# Patient Record
Sex: MALE | Race: BLACK OR AFRICAN AMERICAN | NOT HISPANIC OR LATINO | Employment: OTHER | URBAN - METROPOLITAN AREA
[De-identification: names, ages, dates, MRNs, and addresses within clinical notes are randomized per-mention and may not be internally consistent; named-entity substitution may affect disease eponyms.]

---

## 2018-08-03 ENCOUNTER — OFFICE VISIT (OUTPATIENT)
Dept: FAMILY MEDICINE | Facility: CLINIC | Age: 43
End: 2018-08-03
Attending: FAMILY MEDICINE
Payer: COMMERCIAL

## 2018-08-03 VITALS
OXYGEN SATURATION: 95 % | BODY MASS INDEX: 37.01 KG/M2 | DIASTOLIC BLOOD PRESSURE: 72 MMHG | HEIGHT: 67 IN | WEIGHT: 235.81 LBS | HEART RATE: 81 BPM | SYSTOLIC BLOOD PRESSURE: 110 MMHG

## 2018-08-03 DIAGNOSIS — K21.9 GASTROESOPHAGEAL REFLUX DISEASE, ESOPHAGITIS PRESENCE NOT SPECIFIED: ICD-10-CM

## 2018-08-03 DIAGNOSIS — R42 DIZZINESS: Primary | ICD-10-CM

## 2018-08-03 DIAGNOSIS — M54.32 LEFT SIDED SCIATICA: ICD-10-CM

## 2018-08-03 DIAGNOSIS — Z00.00 LABORATORY EXAM ORDERED AS PART OF ROUTINE GENERAL MEDICAL EXAMINATION: ICD-10-CM

## 2018-08-03 DIAGNOSIS — B35.3 TINEA PEDIS, UNSPECIFIED LATERALITY: ICD-10-CM

## 2018-08-03 DIAGNOSIS — B35.1 ONYCHOMYCOSIS OF TOENAIL: ICD-10-CM

## 2018-08-03 PROCEDURE — 99999 PR PBB SHADOW E&M-NEW PATIENT-LVL III: CPT | Mod: PBBFAC,,, | Performed by: FAMILY MEDICINE

## 2018-08-03 PROCEDURE — 99205 OFFICE O/P NEW HI 60 MIN: CPT | Mod: S$GLB,,, | Performed by: FAMILY MEDICINE

## 2018-08-03 PROCEDURE — 3008F BODY MASS INDEX DOCD: CPT | Mod: CPTII,S$GLB,, | Performed by: FAMILY MEDICINE

## 2018-08-03 RX ORDER — TERBINAFINE HYDROCHLORIDE 250 MG/1
250 TABLET ORAL DAILY
Qty: 28 TABLET | Refills: 2 | Status: SHIPPED | OUTPATIENT
Start: 2018-08-03 | End: 2018-10-26

## 2018-08-03 RX ORDER — GABAPENTIN 300 MG/1
300 CAPSULE ORAL 3 TIMES DAILY
Qty: 60 CAPSULE | Refills: 2 | Status: SHIPPED | OUTPATIENT
Start: 2018-08-03 | End: 2018-08-31 | Stop reason: SDUPTHER

## 2018-08-03 RX ORDER — NAPROXEN 500 MG/1
500 TABLET ORAL 2 TIMES DAILY WITH MEALS
Qty: 30 TABLET | Refills: 1 | Status: SHIPPED | OUTPATIENT
Start: 2018-08-03 | End: 2019-05-24 | Stop reason: SDUPTHER

## 2018-08-03 RX ORDER — OMEPRAZOLE 40 MG/1
40 CAPSULE, DELAYED RELEASE ORAL DAILY
Qty: 30 CAPSULE | Refills: 1 | Status: SHIPPED | OUTPATIENT
Start: 2018-08-03 | End: 2019-08-03

## 2018-08-03 NOTE — PROGRESS NOTES
"Subjective:       Patient ID: Rey Li is a 42 y.o. male.    Chief Complaint: Establish Care      The patient presents today for first visit with me.  He has multiple issues he would like addressed.    Dizziness:   Chronicity:  New  Onset:  More than 1 month ago (began 6 moths ago)  Frequency:  Every few weeks  Pain Scale:  0/10  Severity:  Moderate  Duration:  1 minute  Dizziness characteristics:  Off-balance and lightheaded/impending faint   Associated symptoms: headaches, tinnitus, nausea, diaphoresis, light-headedness and palpitations.no hearing loss, no ear pain, no vomiting, no weakness, no slurred speech, no numbness in extremities and no chest pain.  Aggravated by:  Getting up and position changes  Risk factors: none known.  Treatments tried:  Nothingno neurologic disease, no head trauma, no head trauma and no environmental allergies.  Gastroesophageal Reflux   He complains of belching, early satiety, heartburn and nausea. He reports no abdominal pain, no chest pain, no coughing, no dysphagia, no sore throat or no wheezing. This is a new problem. The current episode started more than 1 year ago. The problem occurs occasionally. The problem has been gradually improving. The heartburn duration is several minutes. The heartburn is located in the substernum. The heartburn is of moderate intensity. The heartburn does not wake him from sleep. The heartburn does not limit his activity. The heartburn changes with position. The symptoms are aggravated by certain foods and stress. Pertinent negatives include no fatigue, melena, muscle weakness or weight loss. Risk factors include caffeine use and smoking/tobacco exposure. He has tried an antacid for the symptoms. The treatment provided moderate relief. Past procedures do not include an abdominal ultrasound, an EGD, H. pylori antibody titer or a UGI.     He is taking a  family member's gabapentin (300mg ~ BID) for "nreve pain."  He describes a deep numbing, " throbbing sensation down his left leg, to his knee.      He was seen at the ED @ [facility] 5/8/2018; amylase elevated, CT abd/pelvis negative.    There is no problem list on file for this patient.    History reviewed. No pertinent surgical history.    No current outpatient prescriptions on file.    Review of patient's allergies indicates:  No Known Allergies    Family History   Problem Relation Age of Onset    Anuerysm Mother         brain    Hypertension Father     Pacemaker/defibrilator Father     No Known Problems Sister     No Known Problems Brother     No Known Problems Brother     No Known Problems Brother     No Known Problems Brother        Social History     Social History    Marital status:      Spouse name: N/A    Number of children: 4    Years of education: N/A     Occupational History          Social History Main Topics    Smoking status: Current Every Day Smoker     Years: 27.00     Types: Cigars     Start date: 9/20/1990    Smokeless tobacco: Not on file      Comment: 2-3 cigars/day    Alcohol use No    Drug use: No    Sexual activity: Yes     Partners: Female     Other Topics Concern    Not on file     Social History Narrative    The patient does not exercise regularly ().      He is not satisfied with weight.    Rates diet as fair.    He does not drink at least 1/2 gallon water daily.    He drinks 3 coffee/tea/caffeine-containing soft drinks daily.    Total sleep time at night is 5-6 hours.    He works 50 hours per week.    He does wear seat belts.    Hobbies include fishing.               Patient Care Team:  Primary Doctor No as PCP - General    Review of Systems   Constitutional: Positive for diaphoresis. Negative for fatigue, unexpected weight change and weight loss.   HENT: Positive for tinnitus. Negative for ear discharge, ear pain, hearing loss, sore throat and voice change.    Eyes: Negative for pain.   Respiratory: Negative for cough, shortness of  breath and wheezing.    Cardiovascular: Positive for palpitations. Negative for chest pain and leg swelling.   Gastrointestinal: Positive for heartburn and nausea. Negative for abdominal pain, blood in stool, constipation, diarrhea, dysphagia, melena and vomiting.   Genitourinary: Negative for decreased urine volume, difficulty urinating, dysuria, enuresis, frequency, hematuria and urgency.   Musculoskeletal: Positive for back pain (sicne fractured coccyx). Negative for arthralgias, myalgias and muscle weakness.   Skin: Negative for rash.        Athlete's feet   Allergic/Immunologic: Negative for environmental allergies.   Neurological: Positive for dizziness, light-headedness and headaches. Negative for weakness.   Hematological: Does not bruise/bleed easily.   Psychiatric/Behavioral: Positive for sleep disturbance (snores, but no apnea accd to wife). Negative for dysphoric mood. The patient is not nervous/anxious.        Objective:      Physical Exam   Constitutional: He is oriented to person, place, and time. He appears well-developed and well-nourished. He is cooperative.   HENT:   Head: Normocephalic and atraumatic.   Right Ear: External ear normal.   Left Ear: External ear normal.   Nose: Nose normal.   Mouth/Throat: Oropharynx is clear and moist and mucous membranes are normal. No oropharyngeal exudate.   Eyes: Conjunctivae are normal. No scleral icterus.   Neck: Neck supple. No JVD present. Carotid bruit is not present. No thyromegaly present.   Cardiovascular: Normal rate, regular rhythm, normal heart sounds and normal pulses.  Exam reveals no gallop and no friction rub.    No murmur heard.  Pulmonary/Chest: Effort normal and breath sounds normal. He has no wheezes. He has no rhonchi. He has no rales.   Abdominal: Soft. Bowel sounds are normal. He exhibits no distension and no mass. There is no splenomegaly or hepatomegaly. There is no tenderness.   Musculoskeletal: Normal range of motion. He exhibits no  "edema.        Lumbar back: He exhibits tenderness (sciatic nothc on left), bony tenderness (left SI) and pain.   Positive SLR at 45 degrees.  Negative FADIR/positive JULIO test.   Lymphadenopathy:     He has no cervical adenopathy.     He has no axillary adenopathy.   Neurological: He is alert and oriented to person, place, and time. He has normal strength and normal reflexes. No cranial nerve deficit or sensory deficit. He exhibits normal muscle tone. He displays a negative Romberg sign. Coordination and gait normal.   Skin: Skin is warm and dry.   Onychomycosis both great toenails, plus tinea pedis right foot   Psychiatric: He has a normal mood and affect.   Vitals reviewed.        Assessment:       1. Dizziness    2. Gastroesophageal reflux disease, esophagitis presence not specified    3. Left sided sciatica    4. Tinea pedis, unspecified laterality    5. Onychomycosis of toenail          Plan:       Labs (see Orders).    Omeprazole x 8 weeks.  If no improvement (or if symptoms return shortly after 8 week trial completed), refer for EGD.    Terbinafine x 12 weeks.    Refer for PT evaluation.  Trial of naproxen.  At patient's request, Rx for gabapentin.  PT evaluation.    We will call the patient with results & make further recommendations at that time.      "This note will not be shared with the patient."  "

## 2018-08-09 ENCOUNTER — TELEPHONE (OUTPATIENT)
Dept: FAMILY MEDICINE | Facility: CLINIC | Age: 43
End: 2018-08-09

## 2018-08-09 ENCOUNTER — LAB VISIT (OUTPATIENT)
Dept: LAB | Facility: HOSPITAL | Age: 43
End: 2018-08-09
Attending: FAMILY MEDICINE
Payer: COMMERCIAL

## 2018-08-09 DIAGNOSIS — K21.9 GASTROESOPHAGEAL REFLUX DISEASE, ESOPHAGITIS PRESENCE NOT SPECIFIED: ICD-10-CM

## 2018-08-09 DIAGNOSIS — Z00.00 LABORATORY EXAM ORDERED AS PART OF ROUTINE GENERAL MEDICAL EXAMINATION: ICD-10-CM

## 2018-08-09 LAB
AMYLASE SERPL-CCNC: 82 U/L
CHOLEST SERPL-MCNC: 160 MG/DL
CHOLEST/HDLC SERPL: 3.6 {RATIO}
COMPLEXED PSA SERPL-MCNC: 1.3 NG/ML
ESTIMATED AVG GLUCOSE: 77 MG/DL
HBA1C MFR BLD HPLC: 4.3 %
HDLC SERPL-MCNC: 45 MG/DL
HDLC SERPL: 28.1 %
LDLC SERPL CALC-MCNC: 99.6 MG/DL
NONHDLC SERPL-MCNC: 115 MG/DL
T4 FREE SERPL-MCNC: 1.02 NG/DL
TESTOST SERPL-MCNC: 553 NG/DL
TRIGL SERPL-MCNC: 77 MG/DL
TSH SERPL DL<=0.005 MIU/L-ACNC: 0.34 UIU/ML

## 2018-08-09 PROCEDURE — 84153 ASSAY OF PSA TOTAL: CPT

## 2018-08-09 PROCEDURE — 36415 COLL VENOUS BLD VENIPUNCTURE: CPT | Mod: PO

## 2018-08-09 PROCEDURE — 84443 ASSAY THYROID STIM HORMONE: CPT

## 2018-08-09 PROCEDURE — 82150 ASSAY OF AMYLASE: CPT

## 2018-08-09 PROCEDURE — 80061 LIPID PANEL: CPT

## 2018-08-09 PROCEDURE — 83036 HEMOGLOBIN GLYCOSYLATED A1C: CPT

## 2018-08-09 PROCEDURE — 84439 ASSAY OF FREE THYROXINE: CPT

## 2018-08-09 PROCEDURE — 84403 ASSAY OF TOTAL TESTOSTERONE: CPT

## 2018-08-09 NOTE — TELEPHONE ENCOUNTER
Attempted to reach the patient to inform him taht his Naproxen is on back order until further notice. Therefore Dr. Durant recommends he take over-the-counter Aleve for it's replacement. Patient can take 1 tablet of Aleve two times daily with meals. No answer. Left voicemail to return call.

## 2018-08-10 ENCOUNTER — PATIENT MESSAGE (OUTPATIENT)
Dept: FAMILY MEDICINE | Facility: CLINIC | Age: 43
End: 2018-08-10

## 2018-08-11 ENCOUNTER — PATIENT MESSAGE (OUTPATIENT)
Dept: FAMILY MEDICINE | Facility: CLINIC | Age: 43
End: 2018-08-11

## 2018-08-13 ENCOUNTER — PATIENT MESSAGE (OUTPATIENT)
Dept: FAMILY MEDICINE | Facility: CLINIC | Age: 43
End: 2018-08-13

## 2018-08-14 ENCOUNTER — PATIENT MESSAGE (OUTPATIENT)
Dept: FAMILY MEDICINE | Facility: CLINIC | Age: 43
End: 2018-08-14

## 2018-08-14 DIAGNOSIS — E05.90 SUBCLINICAL HYPERTHYROIDISM: ICD-10-CM

## 2018-08-15 ENCOUNTER — PATIENT MESSAGE (OUTPATIENT)
Dept: FAMILY MEDICINE | Facility: CLINIC | Age: 43
End: 2018-08-15

## 2018-08-15 PROBLEM — E05.90 SUBCLINICAL HYPERTHYROIDISM: Status: ACTIVE | Noted: 2018-08-15

## 2018-08-15 RX ORDER — METRONIDAZOLE 250 MG/1
250 TABLET ORAL EVERY 6 HOURS
Qty: 40 TABLET | Refills: 0 | Status: SHIPPED | OUTPATIENT
Start: 2018-08-15 | End: 2018-08-25

## 2018-08-15 RX ORDER — TETRACYCLINE HYDROCHLORIDE 500 MG/1
500 CAPSULE ORAL EVERY 6 HOURS
Qty: 40 CAPSULE | Refills: 0 | Status: SHIPPED | OUTPATIENT
Start: 2018-08-15 | End: 2018-08-16

## 2018-08-15 NOTE — TELEPHONE ENCOUNTER
Patient continues to ask about a specialist.  I believe that his symptoms are not related to his thyroid condition, will likely to anxiety.  However, given his level of concern, I will refer him to a specialist.

## 2018-08-15 NOTE — TELEPHONE ENCOUNTER
There aren't any appointments available in Endocrine . I called the department and was told the same thing.

## 2018-08-16 ENCOUNTER — PATIENT MESSAGE (OUTPATIENT)
Dept: FAMILY MEDICINE | Facility: CLINIC | Age: 43
End: 2018-08-16

## 2018-08-16 RX ORDER — CLARITHROMYCIN 500 MG/1
500 TABLET, FILM COATED ORAL EVERY 12 HOURS
Qty: 20 TABLET | Refills: 0 | Status: SHIPPED | OUTPATIENT
Start: 2018-08-16 | End: 2018-08-26

## 2018-08-31 ENCOUNTER — PATIENT MESSAGE (OUTPATIENT)
Dept: FAMILY MEDICINE | Facility: CLINIC | Age: 43
End: 2018-08-31

## 2018-08-31 DIAGNOSIS — M54.32 LEFT SIDED SCIATICA: ICD-10-CM

## 2018-08-31 RX ORDER — GABAPENTIN 300 MG/1
300 CAPSULE ORAL 3 TIMES DAILY
Qty: 90 CAPSULE | Refills: 0 | Status: SHIPPED | OUTPATIENT
Start: 2018-08-31 | End: 2018-09-30 | Stop reason: SDUPTHER

## 2018-09-05 ENCOUNTER — TELEPHONE (OUTPATIENT)
Dept: FAMILY MEDICINE | Facility: CLINIC | Age: 43
End: 2018-09-05

## 2018-09-05 ENCOUNTER — PATIENT MESSAGE (OUTPATIENT)
Dept: FAMILY MEDICINE | Facility: CLINIC | Age: 43
End: 2018-09-05

## 2018-09-05 NOTE — TELEPHONE ENCOUNTER
Unable to reach the patient. Left detailed voicemail informing the patient [facility].

## 2018-09-05 NOTE — TELEPHONE ENCOUNTER
----- Message from Deborah Sultana sent at 9/5/2018  8:51 AM CDT -----            Name of Who is Calling:Lainey([facility])      What is the request in detail: Rep state she wasn't able to successfully reach the pt to schedule an appt.      Can the clinic reply by [facility]: no      What Number to Call Back if not in [facility]: [phone]

## 2018-09-05 NOTE — TELEPHONE ENCOUNTER
----- Message from Deborah Sultana sent at 9/5/2018  8:51 AM CDT -----            Name of Who is Calling:Lainey([facility])      What is the request in detail: Rep state she wasn't able to successfully reach the pt to schedule an appt.      Can the clinic reply by [facility]: no      What Number to Call Back if not in [facility]: [phone]

## 2018-09-30 DIAGNOSIS — M54.32 LEFT SIDED SCIATICA: ICD-10-CM

## 2018-09-30 RX ORDER — GABAPENTIN 300 MG/1
CAPSULE ORAL
Qty: 90 CAPSULE | Refills: 0 | Status: SHIPPED | OUTPATIENT
Start: 2018-09-30 | End: 2018-10-28 | Stop reason: SDUPTHER

## 2018-10-28 DIAGNOSIS — M54.32 LEFT SIDED SCIATICA: ICD-10-CM

## 2018-10-28 RX ORDER — GABAPENTIN 300 MG/1
CAPSULE ORAL
Qty: 90 CAPSULE | Refills: 2 | Status: SHIPPED | OUTPATIENT
Start: 2018-10-28 | End: 2019-01-24 | Stop reason: SDUPTHER

## 2018-11-08 DIAGNOSIS — B35.1 ONYCHOMYCOSIS OF TOENAIL: ICD-10-CM

## 2018-11-08 RX ORDER — TERBINAFINE HYDROCHLORIDE 250 MG/1
250 TABLET ORAL DAILY
Qty: 28 TABLET | Refills: 2 | OUTPATIENT
Start: 2018-11-08 | End: 2019-11-08

## 2018-11-09 DIAGNOSIS — B35.1 ONYCHOMYCOSIS OF TOENAIL: ICD-10-CM

## 2018-11-11 RX ORDER — TERBINAFINE HYDROCHLORIDE 250 MG/1
TABLET ORAL
Qty: 28 TABLET | Refills: 0 | OUTPATIENT
Start: 2018-11-11

## 2018-11-15 ENCOUNTER — PATIENT MESSAGE (OUTPATIENT)
Dept: FAMILY MEDICINE | Facility: CLINIC | Age: 43
End: 2018-11-15

## 2019-01-24 DIAGNOSIS — M54.32 LEFT SIDED SCIATICA: ICD-10-CM

## 2019-01-24 RX ORDER — GABAPENTIN 300 MG/1
CAPSULE ORAL
Qty: 90 CAPSULE | Refills: 0 | Status: SHIPPED | OUTPATIENT
Start: 2019-01-24 | End: 2019-05-24 | Stop reason: SDUPTHER

## 2019-04-26 PROBLEM — Z91.89 FRAMINGHAM CARDIAC RISK <10% IN NEXT 10 YEARS: Status: ACTIVE | Noted: 2019-04-26

## 2019-04-30 ENCOUNTER — PATIENT MESSAGE (OUTPATIENT)
Dept: FAMILY MEDICINE | Facility: CLINIC | Age: 44
End: 2019-04-30

## 2019-04-30 ENCOUNTER — OFFICE VISIT (OUTPATIENT)
Dept: FAMILY MEDICINE | Facility: CLINIC | Age: 44
End: 2019-04-30
Attending: FAMILY MEDICINE
Payer: COMMERCIAL

## 2019-04-30 VITALS
WEIGHT: 201.5 LBS | DIASTOLIC BLOOD PRESSURE: 60 MMHG | SYSTOLIC BLOOD PRESSURE: 120 MMHG | BODY MASS INDEX: 31.63 KG/M2 | OXYGEN SATURATION: 97 % | HEIGHT: 67 IN | HEART RATE: 62 BPM

## 2019-04-30 DIAGNOSIS — G89.21 CHRONIC PAIN DUE TO TRAUMA: ICD-10-CM

## 2019-04-30 DIAGNOSIS — Z01.84 IMMUNITY STATUS TESTING: ICD-10-CM

## 2019-04-30 DIAGNOSIS — G44.229 CHRONIC TENSION-TYPE HEADACHE, NOT INTRACTABLE: ICD-10-CM

## 2019-04-30 DIAGNOSIS — E05.90 SUBCLINICAL HYPERTHYROIDISM: ICD-10-CM

## 2019-04-30 DIAGNOSIS — F41.9 SEVERE ANXIETY: Primary | ICD-10-CM

## 2019-04-30 DIAGNOSIS — R00.2 PALPITATIONS: ICD-10-CM

## 2019-04-30 PROCEDURE — 93010 ELECTROCARDIOGRAM REPORT: CPT | Mod: S$GLB,,, | Performed by: INTERNAL MEDICINE

## 2019-04-30 PROCEDURE — 93010 EKG 12-LEAD: ICD-10-PCS | Mod: S$GLB,,, | Performed by: INTERNAL MEDICINE

## 2019-04-30 PROCEDURE — 93005 EKG 12-LEAD: ICD-10-PCS | Mod: S$GLB,,, | Performed by: FAMILY MEDICINE

## 2019-04-30 PROCEDURE — 3008F BODY MASS INDEX DOCD: CPT | Mod: CPTII,S$GLB,, | Performed by: FAMILY MEDICINE

## 2019-04-30 PROCEDURE — 99215 OFFICE O/P EST HI 40 MIN: CPT | Mod: S$GLB,,, | Performed by: FAMILY MEDICINE

## 2019-04-30 PROCEDURE — 3008F PR BODY MASS INDEX (BMI) DOCUMENTED: ICD-10-PCS | Mod: CPTII,S$GLB,, | Performed by: FAMILY MEDICINE

## 2019-04-30 PROCEDURE — 93005 ELECTROCARDIOGRAM TRACING: CPT | Mod: S$GLB,,, | Performed by: FAMILY MEDICINE

## 2019-04-30 PROCEDURE — 99999 PR PBB SHADOW E&M-EST. PATIENT-LVL IV: ICD-10-PCS | Mod: PBBFAC,,, | Performed by: FAMILY MEDICINE

## 2019-04-30 PROCEDURE — 99215 PR OFFICE/OUTPT VISIT, EST, LEVL V, 40-54 MIN: ICD-10-PCS | Mod: S$GLB,,, | Performed by: FAMILY MEDICINE

## 2019-04-30 PROCEDURE — 99999 PR PBB SHADOW E&M-EST. PATIENT-LVL IV: CPT | Mod: PBBFAC,,, | Performed by: FAMILY MEDICINE

## 2019-04-30 NOTE — PATIENT INSTRUCTIONS
Eva,     We are always striving for excellence. Should you receive a patient experience survey in the mail, we would appreciate if you would take a few moments to give us your feedback. These surveys let us know our strengths as well as areas of opportunity for improvement to better serve you.    Thank you for your time,  Juana Huber LPN    Test results will be communicated to you via : [facility], Telephone or Letter.   If you have not received test results in one week, please contact the clinic at   [phone].

## 2019-04-30 NOTE — PROGRESS NOTES
"Subjective:       Patient ID: Rey Li is a 43 y.o. male who returns to the office today for follow-up.  He has previously canceled or no showed for 2 prior appointments in December, and 2 prior appointments last week.  His 1st and only visit was in August, 2018..    Chief Complaint: Follow-up    HPI     He returns requesting refills on his medications.  He gives a history of a fractured tailbone in ~[id number], and began taking his wife's/fatehr';s gabapentin.  When I "refilled" his medication for him, he now admits that I was the "first " MD to prescribe this medication for him, as he was taking it for 10-11 years without a Rx.    He also began taking PPIs in  The same way.  He was never prescribed this medication, but began taking it for his symptoms.  He has never had any endoscopy.      Patient Active Problem List   Diagnosis    Subclinical hyperthyroidism    Vilas cardiac risk <10% in next 10 years       Current Outpatient Medications:     gabapentin (NEURONTIN) 300 MG capsule, TAKE 1 CAPSULE(300 MG) BY MOUTH THREE TIMES DAILY, Disp: 90 capsule, Rfl: 0    naproxen (EC NAPROSYN) 500 MG EC tablet, Take 1 tablet (500 mg total) by mouth 2 (two) times daily with meals., Disp: 30 tablet, Rfl: 1    omeprazole (PRILOSEC) 40 MG capsule, Take 1 capsule (40 mg total) by mouth once daily., Disp: 30 capsule, Rfl: 1    The following portions of the patient's history were reviewed and updated as appropriate: allergies, past family history, past medical history, past social history and past surgical history.    Review of Systems   Constitutional: Negative for activity change and unexpected weight change (but did lose 35 lbs /8 months with diet).   HENT: Negative for hearing loss, rhinorrhea and trouble swallowing.    Eyes: Negative for discharge and visual disturbance.   Respiratory: Negative for chest tightness and wheezing.    Cardiovascular: Positive for palpitations. Negative for chest pain. " "  Gastrointestinal: Negative for blood in stool, constipation, diarrhea and vomiting.        "gas pains" with occ GERD   Endocrine: Negative for polydipsia and polyuria.   Genitourinary: Negative for difficulty urinating, hematuria and urgency.   Musculoskeletal: Negative for arthralgias, joint swelling and neck pain.   Neurological: Positive for headaches (left occipital). Negative for weakness.   Psychiatric/Behavioral: Negative for confusion and dysphoric mood. The patient is nervous/anxious.        ANDREA-7 Questionnaire    Over the last two weeks, how often have you been bothered by the following problems:  0 Not at all   1 Several days  2 More than half the days  3 Nearly every day    Feeling nervous, anxious, or on edge 3    Not being able to stop or control worrying  3    Worrying too much about different things  2    Trouble relaxing  3    Being so restless that it's hard to sit still 3    Becoming easily annoyed or irritable  3    Feeling afraid as if something awful might happen 3      How difficult have these problems made it for you   to do your work,  take care of things at home, or   get along with other people? Not difficult at all   Somewhat difficult   Very difficult   Extremely difficult    Total Score: 20    Total Score Anxiety Severity  1-4  Minimal anxiety  5-9  Mild anxiety  10-14  Moderate anxiety  15-21  Severe anxiety           Objective:      /60 (BP Location: Right arm, Patient Position: Sitting, BP Method: Large (Manual))   Pulse 62   Ht 5' 7" (1.702 m)   Wt 91.4 kg (201 lb 8 oz)   SpO2 97%   BMI 31.56 kg/m²     Physical Exam   Constitutional: He is oriented to person, place, and time. He appears well-developed and well-nourished. He is cooperative.   HENT:   Head: Normocephalic and atraumatic.   Right Ear: External ear normal.   Left Ear: External ear normal.   Nose: Nose normal.   Mouth/Throat: Oropharynx is clear and moist and mucous membranes are normal. No oropharyngeal " "exudate.   Eyes: Conjunctivae are normal. No scleral icterus.   Neck: Neck supple. No JVD present. Carotid bruit is not present. No thyromegaly present.   Cardiovascular: Normal rate, regular rhythm, normal heart sounds and normal pulses. Exam reveals no gallop and no friction rub.   No murmur heard.  Pulmonary/Chest: Effort normal and breath sounds normal. He has no wheezes. He has no rhonchi. He has no rales.   Abdominal: Soft. Bowel sounds are normal. He exhibits no distension and no mass. There is no splenomegaly or hepatomegaly. There is no tenderness.   Musculoskeletal: Normal range of motion. He exhibits no edema or tenderness.   Lymphadenopathy:     He has no cervical adenopathy.     He has no axillary adenopathy.   Neurological: He is alert and oriented to person, place, and time. He has normal strength and normal reflexes. No cranial nerve deficit or sensory deficit. Coordination normal.   Skin: Skin is warm and dry.   Psychiatric: He has a normal mood and affect.   Vitals reviewed.      EKG reveals sinus bradycardia with possible left atrial enlargement.    Assessment:       1. Severe anxiety    2. Palpitations    3. Chronic tension-type headache, not intractable    4. Chronic pain due to trauma    5. Subclinical hyperthyroidism          Plan:       Laboratory investigation (see orders).  Patient and wife agree to research possible counselors/therapists who accepts his insurance.  Discussed possible treatments for anxiety.  Xrays of lumbar spine.     Orders Placed This Encounter    X-Ray Lumbar Spine AP And Lateral    Hepatitis B surface antibody    CBC auto differential    Comprehensive metabolic panel    T4, free    TSH    SHARAN Screen w/Reflex    Sedimentation rate    Rheumatoid factor    Hepatitis A antibody, IgG    EKG 12-lead     Further recommendations to follow after above.      "This note will not be shared with the patient."  "

## 2019-05-01 ENCOUNTER — PATIENT MESSAGE (OUTPATIENT)
Dept: FAMILY MEDICINE | Facility: CLINIC | Age: 44
End: 2019-05-01

## 2019-05-02 ENCOUNTER — PATIENT MESSAGE (OUTPATIENT)
Dept: FAMILY MEDICINE | Facility: CLINIC | Age: 44
End: 2019-05-02

## 2019-05-02 ENCOUNTER — HOSPITAL ENCOUNTER (OUTPATIENT)
Dept: RADIOLOGY | Facility: HOSPITAL | Age: 44
Discharge: HOME | End: 2019-05-02
Attending: FAMILY MEDICINE
Payer: COMMERCIAL

## 2019-05-02 DIAGNOSIS — G89.21 CHRONIC PAIN DUE TO TRAUMA: ICD-10-CM

## 2019-05-02 PROCEDURE — 72100 XR LUMBAR SPINE AP AND LATERAL: ICD-10-PCS | Mod: 26,,, | Performed by: RADIOLOGY

## 2019-05-02 PROCEDURE — 72100 X-RAY EXAM L-S SPINE 2/3 VWS: CPT | Mod: TC,FY

## 2019-05-02 PROCEDURE — 72100 X-RAY EXAM L-S SPINE 2/3 VWS: CPT | Mod: 26,,, | Performed by: RADIOLOGY

## 2019-05-03 ENCOUNTER — PATIENT MESSAGE (OUTPATIENT)
Dept: FAMILY MEDICINE | Facility: CLINIC | Age: 44
End: 2019-05-03

## 2019-05-03 PROBLEM — Z78.9 IMMUNE TO HEPATITIS A: Status: ACTIVE | Noted: 2019-05-03

## 2019-05-03 PROBLEM — Z78.9 IMMUNE TO HEPATITIS B: Status: ACTIVE | Noted: 2019-05-03

## 2019-05-03 LAB
ALBUMIN SERPL-MCNC: 4.3 G/DL (ref 3.6–5.1)
ALBUMIN/GLOB SERPL: 1.5 (CALC) (ref 1–2.5)
ALP SERPL-CCNC: 108 U/L (ref 40–115)
ALT SERPL-CCNC: 17 U/L (ref 9–46)
ANA SER QL IF: NEGATIVE
AST SERPL-CCNC: 24 U/L (ref 10–40)
BASOPHILS # BLD AUTO: 70 CELLS/UL (ref 0–200)
BASOPHILS NFR BLD AUTO: 0.8 %
BILIRUB SERPL-MCNC: 0.9 MG/DL (ref 0.2–1.2)
BUN SERPL-MCNC: 13 MG/DL (ref 7–25)
BUN/CREAT SERPL: NORMAL (CALC) (ref 6–22)
CALCIUM SERPL-MCNC: 9.5 MG/DL (ref 8.6–10.3)
CHLORIDE SERPL-SCNC: 102 MMOL/L (ref 98–110)
CO2 SERPL-SCNC: 31 MMOL/L (ref 20–32)
CREAT SERPL-MCNC: 1.12 MG/DL (ref 0.6–1.35)
EOSINOPHIL # BLD AUTO: 148 CELLS/UL (ref 15–500)
EOSINOPHIL NFR BLD AUTO: 1.7 %
ERYTHROCYTE [DISTWIDTH] IN BLOOD BY AUTOMATED COUNT: 11.3 % (ref 11–15)
ERYTHROCYTE [SEDIMENTATION RATE] IN BLOOD BY WESTERGREN METHOD: 2 MM/H
GFRSERPLBLD MDRD-ARVRAT: 81 ML/MIN/1.73M2
GLOBULIN SER CALC-MCNC: 2.9 G/DL (CALC) (ref 1.9–3.7)
GLUCOSE SERPL-MCNC: 88 MG/DL (ref 65–99)
HAV AB SER QL IA: REACTIVE
HBV SURFACE AB SER QL IA: REACTIVE
HCT VFR BLD AUTO: 50.4 % (ref 38.5–50)
HGB BLD-MCNC: 16.2 G/DL (ref 13.2–17.1)
LYMPHOCYTES # BLD AUTO: 2358 CELLS/UL (ref 850–3900)
LYMPHOCYTES NFR BLD AUTO: 27.1 %
MCH RBC QN AUTO: 29.8 PG (ref 27–33)
MCHC RBC AUTO-ENTMCNC: 32.1 G/DL (ref 32–36)
MCV RBC AUTO: 92.8 FL (ref 80–100)
MONOCYTES # BLD AUTO: 513 CELLS/UL (ref 200–950)
MONOCYTES NFR BLD AUTO: 5.9 %
NEUTROPHILS # BLD AUTO: 5612 CELLS/UL (ref 1500–7800)
NEUTROPHILS NFR BLD AUTO: 64.5 %
PLATELET # BLD AUTO: 229 THOUSAND/UL (ref 140–400)
PMV BLD REES-ECKER: 9.7 FL (ref 7.5–12.5)
POTASSIUM SERPL-SCNC: 5.3 MMOL/L (ref 3.5–5.3)
PROT SERPL-MCNC: 7.2 G/DL (ref 6.1–8.1)
RBC # BLD AUTO: 5.43 MILLION/UL (ref 4.2–5.8)
RHEUMATOID FACT SERPL-ACNC: <14 IU/ML
SODIUM SERPL-SCNC: 139 MMOL/L (ref 135–146)
T4 FREE SERPL-MCNC: 1.4 NG/DL (ref 0.8–1.8)
TSH SERPL-ACNC: 0.41 MIU/L (ref 0.4–4.5)
WBC # BLD AUTO: 8.7 THOUSAND/UL (ref 3.8–10.8)

## 2019-05-04 ENCOUNTER — PATIENT MESSAGE (OUTPATIENT)
Dept: FAMILY MEDICINE | Facility: CLINIC | Age: 44
End: 2019-05-04

## 2019-05-06 ENCOUNTER — PATIENT MESSAGE (OUTPATIENT)
Dept: FAMILY MEDICINE | Facility: CLINIC | Age: 44
End: 2019-05-06

## 2019-05-15 ENCOUNTER — PATIENT MESSAGE (OUTPATIENT)
Dept: FAMILY MEDICINE | Facility: CLINIC | Age: 44
End: 2019-05-15

## 2019-05-16 ENCOUNTER — PATIENT MESSAGE (OUTPATIENT)
Dept: FAMILY MEDICINE | Facility: CLINIC | Age: 44
End: 2019-05-16

## 2019-05-17 ENCOUNTER — PATIENT MESSAGE (OUTPATIENT)
Dept: FAMILY MEDICINE | Facility: CLINIC | Age: 44
End: 2019-05-17

## 2019-05-17 DIAGNOSIS — M53.3 COCCYDYNIA: Primary | ICD-10-CM

## 2019-05-18 ENCOUNTER — PATIENT MESSAGE (OUTPATIENT)
Dept: FAMILY MEDICINE | Facility: CLINIC | Age: 44
End: 2019-05-18

## 2019-05-22 ENCOUNTER — OFFICE VISIT (OUTPATIENT)
Dept: PAIN MEDICINE | Facility: CLINIC | Age: 44
End: 2019-05-22
Payer: COMMERCIAL

## 2019-05-22 VITALS
WEIGHT: 201 LBS | BODY MASS INDEX: 31.48 KG/M2 | DIASTOLIC BLOOD PRESSURE: 70 MMHG | SYSTOLIC BLOOD PRESSURE: 133 MMHG | HEART RATE: 88 BPM

## 2019-05-22 DIAGNOSIS — M79.18 PAIN IN LEFT BUTTOCK: ICD-10-CM

## 2019-05-22 DIAGNOSIS — M79.2 NEUROPATHIC PAIN: Primary | ICD-10-CM

## 2019-05-22 DIAGNOSIS — G54.1 NEUROPATHY, SACRAL PLEXUS: ICD-10-CM

## 2019-05-22 PROCEDURE — 99999 PR PBB SHADOW E&M-EST. PATIENT-LVL III: ICD-10-PCS | Mod: PBBFAC,,, | Performed by: PAIN MEDICINE

## 2019-05-22 PROCEDURE — 99204 PR OFFICE/OUTPT VISIT, NEW, LEVL IV, 45-59 MIN: ICD-10-PCS | Mod: S$GLB,,, | Performed by: PAIN MEDICINE

## 2019-05-22 PROCEDURE — 99999 PR PBB SHADOW E&M-EST. PATIENT-LVL III: CPT | Mod: PBBFAC,,, | Performed by: PAIN MEDICINE

## 2019-05-22 PROCEDURE — 99204 OFFICE O/P NEW MOD 45 MIN: CPT | Mod: S$GLB,,, | Performed by: PAIN MEDICINE

## 2019-05-22 RX ORDER — NORTRIPTYLINE HYDROCHLORIDE 10 MG/1
10 CAPSULE ORAL NIGHTLY
Qty: 30 CAPSULE | Refills: 1 | Status: SHIPPED | OUTPATIENT
Start: 2019-05-22 | End: 2019-08-13 | Stop reason: SDUPTHER
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
## 2019-05-23 ENCOUNTER — PATIENT MESSAGE (OUTPATIENT)
Dept: FAMILY MEDICINE | Facility: CLINIC | Age: 44
End: 2019-05-23

## 2019-05-23 DIAGNOSIS — M54.32 LEFT SIDED SCIATICA: ICD-10-CM

## 2019-05-24 RX ORDER — GABAPENTIN 300 MG/1
CAPSULE ORAL
Qty: 90 CAPSULE | Refills: 2 | Status: SHIPPED | OUTPATIENT
Start: 2019-05-24

## 2019-05-24 RX ORDER — NAPROXEN 500 MG/1
500 TABLET ORAL 2 TIMES DAILY WITH MEALS
Qty: 60 TABLET | Refills: 2 | Status: SHIPPED | OUTPATIENT
Start: 2019-05-24

## 2019-05-25 ENCOUNTER — PATIENT MESSAGE (OUTPATIENT)
Dept: FAMILY MEDICINE | Facility: CLINIC | Age: 44
End: 2019-05-25

## 2019-08-13 DIAGNOSIS — M79.2 NEUROPATHIC PAIN: ICD-10-CM

## 2019-08-13 RX ORDER — NORTRIPTYLINE HYDROCHLORIDE 10 MG/1
10 CAPSULE ORAL NIGHTLY
Qty: 30 CAPSULE | Refills: 1 | Status: SHIPPED | OUTPATIENT
Start: 2019-08-13

## 2021-01-04 ENCOUNTER — PATIENT MESSAGE (OUTPATIENT)
Dept: ADMINISTRATIVE | Facility: HOSPITAL | Age: 46
End: 2021-01-04

## 2021-04-26 ENCOUNTER — PATIENT MESSAGE (OUTPATIENT)
Dept: RESEARCH | Facility: HOSPITAL | Age: 46
End: 2021-04-26

## 2024-06-06 ENCOUNTER — OFFICE VISIT (OUTPATIENT)
Dept: SURGERY | Facility: CLINIC | Age: 49
End: 2024-06-06
Payer: MEDICAID

## 2024-06-06 ENCOUNTER — TELEPHONE (OUTPATIENT)
Dept: SURGERY | Facility: CLINIC | Age: 49
End: 2024-06-06
Payer: MEDICAID

## 2024-06-06 VITALS
WEIGHT: 211.31 LBS | BODY MASS INDEX: 30.25 KG/M2 | HEART RATE: 66 BPM | DIASTOLIC BLOOD PRESSURE: 76 MMHG | HEIGHT: 70 IN | OXYGEN SATURATION: 98 % | SYSTOLIC BLOOD PRESSURE: 116 MMHG

## 2024-06-06 DIAGNOSIS — M25.511 ACUTE PAIN OF RIGHT SHOULDER: Primary | ICD-10-CM

## 2024-06-06 PROCEDURE — 3078F DIAST BP <80 MM HG: CPT | Mod: CPTII,,, | Performed by: SURGERY

## 2024-06-06 PROCEDURE — 3008F BODY MASS INDEX DOCD: CPT | Mod: CPTII,,, | Performed by: SURGERY

## 2024-06-06 PROCEDURE — 1159F MED LIST DOCD IN RCRD: CPT | Mod: CPTII,,, | Performed by: SURGERY

## 2024-06-06 PROCEDURE — 99213 OFFICE O/P EST LOW 20 MIN: CPT | Mod: PBBFAC,PN | Performed by: SURGERY

## 2024-06-06 PROCEDURE — 99999 PR PBB SHADOW E&M-EST. PATIENT-LVL III: CPT | Mod: PBBFAC,,, | Performed by: SURGERY

## 2024-06-06 PROCEDURE — 3074F SYST BP LT 130 MM HG: CPT | Mod: CPTII,,, | Performed by: SURGERY

## 2024-06-06 PROCEDURE — 99203 OFFICE O/P NEW LOW 30 MIN: CPT | Mod: S$PBB,,, | Performed by: SURGERY

## 2024-06-06 RX ORDER — LIDOCAINE 50 MG/G
PATCH TOPICAL
COMMUNITY
Start: 2024-05-28

## 2024-06-06 RX ORDER — SILDENAFIL 100 MG/1
100 TABLET, FILM COATED ORAL DAILY PRN
COMMUNITY
Start: 2024-03-11

## 2024-06-06 RX ORDER — CLOTRIMAZOLE AND BETAMETHASONE DIPROPIONATE 10; .64 MG/G; MG/G
CREAM TOPICAL 2 TIMES DAILY
COMMUNITY
Start: 2024-03-02

## 2024-06-06 RX ORDER — HYDROCODONE BITARTRATE AND ACETAMINOPHEN 5; 325 MG/1; MG/1
1 TABLET ORAL
COMMUNITY
Start: 2024-05-28

## 2024-06-11 NOTE — PROGRESS NOTES
History & Physical    Subjective     History of Present Illness:  Patient is a 48 y.o. male presents with worsening right shoulder concerned that may have a mass on his right medial biceps area.    Patient did have a recent MRI which showed significant abnormalities his shoulder joint which is likely the cause of the patient's pain.    Went over results with the patient and explain that he has concerns for shoulder bursitis and no soft tissue mass seen on MRI.    We will refer the patient to orthopedic surgery for evaluation and treatment.        Chief Complaint   Patient presents with    Lump     Right upper arm       Review of patient's allergies indicates:  No Known Allergies    Current Outpatient Medications   Medication Sig Dispense Refill    clotrimazole-betamethasone 1-0.05% (LOTRISONE) cream Apply topically 2 (two) times daily.      gabapentin (NEURONTIN) 300 MG capsule Take 1 capsule PO TID. 90 capsule 2    HYDROcodone-acetaminophen (NORCO) 5-325 mg per tablet Take 1 tablet by mouth.      LIDOcaine (LIDODERM) 5 % SMARTSIG:Topical Daily      naproxen (EC NAPROSYN) 500 MG EC tablet Take 1 tablet (500 mg total) by mouth 2 (two) times daily with meals. 60 tablet 2    sildenafiL (VIAGRA) 100 MG tablet Take 100 mg by mouth daily as needed.      nortriptyline (PAMELOR) 10 MG capsule Take 1 capsule (10 mg total) by mouth every evening. (Patient not taking: Reported on 6/6/2024) 30 capsule 1    omeprazole (PRILOSEC) 40 MG capsule Take 1 capsule (40 mg total) by mouth once daily. (Patient not taking: Reported on 6/6/2024) 30 capsule 1     No current facility-administered medications for this visit.       Past Medical History:   Diagnosis Date    Family history of cerebral aneurysm     Fractured coccyx 2007    2/t [facility]     No past surgical history on file.  Family History   Problem Relation Name Age of Onset    Anuerysm Mother          brain    Hypertension Father      Pacemaker/defibrilator Father      No Known  "Problems Sister      No Known Problems Brother      No Known Problems Brother      No Known Problems Brother      No Known Problems Brother       Social History     Tobacco Use    Smoking status: Every Day     Types: Cigars     Start date: 9/20/1990    Smokeless tobacco: Never    Tobacco comments:     2-3 cigars/day   Substance Use Topics    Alcohol use: No    Drug use: No        Review of Systems:  Review of Systems   Constitutional:  Negative for appetite change, fatigue, fever and unexpected weight change.   HENT:  Negative for sore throat and trouble swallowing.    Eyes: Negative.    Respiratory:  Negative for cough, shortness of breath and wheezing.    Cardiovascular:  Negative for chest pain and leg swelling.   Gastrointestinal:  Negative for abdominal distention, abdominal pain, blood in stool, constipation, diarrhea, nausea and vomiting.   Endocrine: Negative.    Genitourinary: Negative.    Musculoskeletal:  Positive for arthralgias (Especially right shoulder, radiating down arm) and myalgias. Negative for back pain.   Skin: Negative.  Negative for rash.   Allergic/Immunologic: Negative.    Neurological: Negative.    Hematological: Negative.    Psychiatric/Behavioral:  Negative for confusion.         Objective     Vital Signs (Most Recent)  Pulse: 66 (06/06/24 1307)  BP: 116/76 (06/06/24 1307)  SpO2: 98 % (06/06/24 1307)  5' 10" (1.778 m)  95.8 kg (211 lb 5 oz)     Physical Exam:  Physical Exam  Vitals and nursing note reviewed.   Constitutional:       Appearance: He is well-developed.   HENT:      Head: Normocephalic and atraumatic.   Cardiovascular:      Rate and Rhythm: Normal rate.      Heart sounds: Normal heart sounds.   Pulmonary:      Effort: Pulmonary effort is normal.   Abdominal:      General: Bowel sounds are normal. There is no distension.      Palpations: Abdomen is soft.      Tenderness: There is no abdominal tenderness.   Musculoskeletal:         General: Normal range of motion.      Cervical " back: Normal range of motion.   Skin:     General: Skin is warm and dry.      Capillary Refill: Capillary refill takes less than 2 seconds.             Comments:  Pain and some weakness on  strength due to pain and shoulder.    Full range of motion however certain positions cause pain.       Neurological:      Mental Status: He is alert and oriented to person, place, and time.   Psychiatric:         Behavior: Behavior normal.     Laboratory  CBC: Reviewed  CMP: Reviewed    Diagnostic Results:  MRI: Reviewed  Has shoulder bursitis several small abnormal findings and tears.    Has  external MRI read     Assessment and Plan    48-year-old male with shoulder pain on the right and abnormal MRI findings.    We will recommend surgical referral to orthopedics

## 2024-07-19 DIAGNOSIS — M25.511 RIGHT SHOULDER PAIN, UNSPECIFIED CHRONICITY: Primary | ICD-10-CM

## 2024-07-22 ENCOUNTER — OFFICE VISIT (OUTPATIENT)
Dept: ORTHOPEDICS | Facility: CLINIC | Age: 49
End: 2024-07-22
Payer: MEDICAID

## 2024-07-22 ENCOUNTER — TELEPHONE (OUTPATIENT)
Dept: ORTHOPEDICS | Facility: CLINIC | Age: 49
End: 2024-07-22

## 2024-07-22 VITALS
WEIGHT: 211.38 LBS | HEART RATE: 68 BPM | BODY MASS INDEX: 30.26 KG/M2 | HEIGHT: 70 IN | DIASTOLIC BLOOD PRESSURE: 72 MMHG | SYSTOLIC BLOOD PRESSURE: 113 MMHG

## 2024-07-22 DIAGNOSIS — M25.511 RIGHT SHOULDER PAIN, UNSPECIFIED CHRONICITY: Primary | ICD-10-CM

## 2024-07-22 DIAGNOSIS — G89.29 CHRONIC RIGHT SHOULDER PAIN: ICD-10-CM

## 2024-07-22 DIAGNOSIS — M25.511 CHRONIC RIGHT SHOULDER PAIN: ICD-10-CM

## 2024-07-22 DIAGNOSIS — M75.101 NONTRAUMATIC TEAR OF SUPRASPINATUS TENDON, RIGHT: Primary | ICD-10-CM

## 2024-07-22 PROCEDURE — 99999PBSHW PR PBB SHADOW TECHNICAL ONLY FILED TO HB: Mod: PBBFAC,,,

## 2024-07-22 PROCEDURE — 99214 OFFICE O/P EST MOD 30 MIN: CPT | Mod: PBBFAC,PN

## 2024-07-22 PROCEDURE — 99999 PR PBB SHADOW E&M-EST. PATIENT-LVL IV: CPT | Mod: PBBFAC,,,

## 2024-07-22 PROCEDURE — 20610 DRAIN/INJ JOINT/BURSA W/O US: CPT | Mod: PBBFAC,PN

## 2024-07-22 RX ORDER — TRIAMCINOLONE ACETONIDE 40 MG/ML
40 INJECTION, SUSPENSION INTRA-ARTICULAR; INTRAMUSCULAR
Status: DISCONTINUED | OUTPATIENT
Start: 2024-07-22 | End: 2024-07-22 | Stop reason: HOSPADM

## 2024-07-22 RX ORDER — TRIAMCINOLONE ACETONIDE 40 MG/ML
40 INJECTION, SUSPENSION INTRA-ARTICULAR; INTRAMUSCULAR
Status: COMPLETED | OUTPATIENT
Start: 2024-07-22 | End: 2024-07-22

## 2024-07-22 RX ADMIN — TRIAMCINOLONE ACETONIDE 40 MG: 40 INJECTION, SUSPENSION INTRA-ARTICULAR; INTRAMUSCULAR at 10:07

## 2024-07-22 RX ADMIN — TRIAMCINOLONE ACETONIDE 40 MG: 40 INJECTION, SUSPENSION INTRA-ARTICULAR; INTRAMUSCULAR at 09:07

## 2024-07-22 NOTE — TELEPHONE ENCOUNTER
----- Message from Sienna Boyd PA-C sent at 7/22/2024  1:39 PM CDT -----  Since the MRI was done at an outside facility he will need to bring the disc into clinic  ----- Message -----  From: Viviana Palmer LPN  Sent: 7/22/2024   1:15 PM CDT  To: Sienna Boyd PA-C      ----- Message -----  From: Oumou Eldridge  Sent: 7/22/2024  12:26 PM CDT  To: [facility]    Name of Caller: Layne      Nature of Call: questions about the requested images    Best Call Back Number: [phone]     Additional Information:  ELZBIETA JETT wife Layne calling regarding Patient Advice for wanting to know if the requested images can be uploaded to the chart or will they need to bring the disc in to the clinic, call back to inform

## 2024-07-22 NOTE — PROGRESS NOTES
Patient ID: Rey Li is a 48 y.o. male    Pain of the Right Shoulder      History of Present Illness:    Rey Li presents to clinic for right shoulder pain. Patient denies known MARY ANNE. The pain started 1 years ago and is becoming progressively worse.  Pain is located over (points to) lateral shoulder . He reports that the pain is a 6 /10 sharp and aching pain toda. The pain is affecting ADLs and limiting desired level of activity. Denies numbness, tingling, radiation and inability to bear weight. Notes small lump on deltoid, thinks it has been there about 8 months, does not think it has changed. No erythema or drainage.     He taken pain medication from PCP. Denies injections, PT or surgery.     Occupation: construction     Ambulating: unassisted  Diabetic: no  Smoking: current  Hx of DVT/PE: no    PAST MEDICAL HISTORY:   Past Medical History:   Diagnosis Date    Family history of cerebral aneurysm     Fractured coccyx 2007    2/t [facility]     PAST SURGICAL HISTORY: History reviewed. No pertinent surgical history.  FAMILY HISTORY:   Family History   Problem Relation Name Age of Onset    Anuerysm Mother          brain    Hypertension Father      Pacemaker/defibrilator Father      No Known Problems Sister      No Known Problems Brother      No Known Problems Brother      No Known Problems Brother      No Known Problems Brother       SOCIAL HISTORY:   Social History     Occupational History    Occupation:    Tobacco Use    Smoking status: Every Day     Types: Cigars     Start date: 9/20/1990    Smokeless tobacco: Never    Tobacco comments:     2-3 cigars/day   Substance and Sexual Activity    Alcohol use: No    Drug use: No    Sexual activity: Yes     Partners: Female        MEDICATIONS:   Current Outpatient Medications:     clotrimazole-betamethasone 1-0.05% (LOTRISONE) cream, Apply topically 2 (two) times daily., Disp: , Rfl:     gabapentin (NEURONTIN) 300 MG capsule, Take 1 capsule PO  TID., Disp: 90 capsule, Rfl: 2    HYDROcodone-acetaminophen (NORCO) 5-325 mg per tablet, Take 1 tablet by mouth., Disp: , Rfl:     LIDOcaine (LIDODERM) 5 %, SMARTSIG:Topical Daily, Disp: , Rfl:     naproxen (EC NAPROSYN) 500 MG EC tablet, Take 1 tablet (500 mg total) by mouth 2 (two) times daily with meals., Disp: 60 tablet, Rfl: 2    sildenafiL (VIAGRA) 100 MG tablet, Take 100 mg by mouth daily as needed., Disp: , Rfl:   No current facility-administered medications for this visit.  ALLERGIES: Review of patient's allergies indicates:  No Known Allergies      Physical Exam     Vitals:    07/22/24 0936   BP: 113/72   Pulse: 68     Alert and oriented to person, place and time. No acute distress. Well-groomed, not ill appearing. Pupils round and reactive, normal respiratory effort, no audible wheezing.     Shoulder / Upper Extremity Exam    OBSERVATION:     Swelling  none  Deformity  none   Discoloration  none   Scapular winging none   Scars   none  Atrophy  None  Small pencil eraser sized mass located to anterior deltoid    TENDERNESS                Clavicle   Negative         AC Jt.    +        SC Jt.    Negative          Acromion:  Negative        Scapular Spine Negative   Supraspinatus  Negative       Infraspinatus  Negative   LH Biceps   Negative   Greater Tub.  Negative   Trapezius  Negative   Cervical spine  Negative        ROM: (* = with pain)              FE    170°  *     ER at 0°    60°        ER at 90° ABD  90°       IR at 90°  ABD   NA         IR (spine level)   T10         STRENGTH: (* = with pain)    SCAPTION   5/5        IR    5/5       ER    5/5       BICEPS   5/5       Deltoid    5/5         SIGNS:  Painful side       NEER   +    OSBS  neg    SANDERS   neg    SPEEDS  neg     DROP ARM   neg   BELLY PRESS neg   Superior escape none    LIFT-OFF  neg   X-Body ADD    +  MOVING VALGUS neg     Imaging:     Bilateral shoulder X-rays ordered/reviewed by me showing no evidence of fracture or  dislocation. There is no obvious malalignment. No evidence of masses, lesions or foreign bodies.     MRI report only Reviewed with the following findings:   AC joint osteoarthritis with subacromial impingement and sub acromial subdeltoid bursitis, supraspinatus tendinosis with acute full-thickness tear the anterior fibers with tendon retraction, infraspinatus tendinosis with acute partial thickness partial with moderate grade articular surface tear with delaminated component extending along the musculotendinous junction, SLAP tear    Assessment & Plan    Nontraumatic tear of supraspinatus tendon, right  -     Ambulatory referral/consult to Physical/Occupational Therapy; Future; Expected date: 07/29/2024  -     triamcinolone acetonide injection 40 mg  -     Large Joint Aspiration/Injection: R subacromial bursa    Chronic right shoulder pain  -     Ambulatory referral/consult to Orthopedics  -     Ambulatory referral/consult to Physical/Occupational Therapy; Future; Expected date: 07/29/2024  -     triamcinolone acetonide injection 40 mg  -     Large Joint Aspiration/Injection: R subacromial bursa         I made the decision to obtain old records of the patient including previous notes and imaging. New imaging was ordered today of the extremity or extremities evaluated. I independently reviewed and interpreted the radiographs and/or MRIs/CT scan today as well as prior imaging.    We discussed at length different treatment options including conservative vs surgical management. These include anti-inflammatories, acetaminophen, rest, ice, heat, formal physical therapy including strengthening and stretching exercises, home exercise programs, injections, dry needling, and finally surgical intervention.      Patient here for chronic shoulder pain.  We reviewed his MRI report in detail which shows acute full-thickness tear of his rotator cuff as well as some arthritis and bursitis.  We discussed shoulder arthroscopy with  rotator cuff repair in detail.  Patient is not interested in surgery right now and would like to trial an injection first.  He understands injections solely improve his pain, will not heal rotator cuff tear.  Right shoulder CSI given, post-injection instructions reviewed.  Given rotator cuff tear, we will place referral to formal physical therapy.  PT referral placed, patient responsible to establish and continue care.  We will have him follow up in about 2 months with a surgeon to further discuss shoulder arthroscopy.  He will drop off MRI disc.  Understands we need this prior to any surgery.    Regarding his possible ump, he had an outside ultrasound but does not have the report or images.  No concerning features today.  We will see how much this injection improves his shoulder pain.  If it improves all of his pain.  He will have the ultrasound report faxed.    Follow up:  Surgeon 8 weeks  X-rays next visit:  None    All questions were answered and patient is agreeable to the above plan.

## 2024-07-22 NOTE — PROCEDURES
Large Joint Aspiration/Injection: R subacromial bursa    Date/Time: 7/22/2024 9:00 AM    Performed by: Sienna Boyd PA-C  Authorized by: Sienna Boyd PA-C    Consent Done?:  Yes (Verbal)  Indications:  Pain  Site marked: the procedure site was marked    Timeout: prior to procedure the correct patient, procedure, and site was verified    Prep: patient was prepped and draped in usual sterile fashion    Local anesthesia used?: No    Local anesthetic:  Topical anesthetic and bupivacaine 0.25% without epinephrine  Anesthetic total (ml):  4      Details:  Needle Size:  22 G  Ultrasonic Guidance for needle placement?: No    Approach:  Posterior  Location:  Shoulder  Site:  R subacromial bursa  Medications:  40 mg triamcinolone acetonide 40 mg/mL  Patient tolerance:  Patient tolerated the procedure well with no immediate complications

## 2024-07-22 NOTE — TELEPHONE ENCOUNTER
Spoke with wife. She was advise that since the MRI was done at an outside facility he will need to bring the disc into clinic. Wife verbalized understanding.

## 2024-07-29 ENCOUNTER — TELEPHONE (OUTPATIENT)
Dept: ORTHOPEDICS | Facility: CLINIC | Age: 49
End: 2024-07-29
Payer: MEDICAID

## 2024-07-29 NOTE — TELEPHONE ENCOUNTER
"----- Message from Sienna Boyd PA-C sent at 7/29/2024 11:42 AM CDT -----  Regarding: RE: pt advice  Contact: [phone]  Correct, injection can take about 2 weeks to kick in as well. But he cannot have another until at least Oct 22nd. OR can schedule appt to discuss surgery with Thomas.  ----- Message -----  From: Viviana Palmer LPN  Sent: 7/29/2024   9:58 AM CDT  To: Sienna Boyd PA-C  Subject: FW: pt advice                                    He needs to wait 3 months right?  ----- Message -----  From: Gris Mehta  Sent: 7/29/2024   9:56 AM CDT  To: [facility]  Subject: pt advice                                        .Name Of Caller:Layne/ wife   Contact Preference?: [phone]     What is the nature of the call?:calling in regards to getting an injection that didn't fully numb pt right arm, needing to know can pt come back in an get a booster, pls call      Additional Notes:  "Thank you for all that you do for our patients"  "

## 2024-07-29 NOTE — TELEPHONE ENCOUNTER
Spoke with patient. Message given Per Sienna: injection can take about 2 weeks to kick in as well. But he cannot have another until at least Oct 22nd. OR can schedule appt to discuss surgery with Thomas. Pt will wait to see Dr Guzman and possible another injection.

## 2024-10-23 ENCOUNTER — OFFICE VISIT (OUTPATIENT)
Dept: ORTHOPEDICS | Facility: CLINIC | Age: 49
End: 2024-10-23
Payer: MEDICAID

## 2024-10-23 VITALS
HEART RATE: 66 BPM | SYSTOLIC BLOOD PRESSURE: 101 MMHG | BODY MASS INDEX: 26.97 KG/M2 | WEIGHT: 187.94 LBS | DIASTOLIC BLOOD PRESSURE: 55 MMHG

## 2024-10-23 DIAGNOSIS — S43.431A SUPERIOR GLENOID LABRUM LESION OF RIGHT SHOULDER, INITIAL ENCOUNTER: ICD-10-CM

## 2024-10-23 DIAGNOSIS — M19.011 ARTHRITIS OF RIGHT ACROMIOCLAVICULAR JOINT: ICD-10-CM

## 2024-10-23 DIAGNOSIS — M75.121 NONTRAUMATIC COMPLETE TEAR OF RIGHT ROTATOR CUFF: Primary | ICD-10-CM

## 2024-10-23 PROCEDURE — 99999 PR PBB SHADOW E&M-EST. PATIENT-LVL III: CPT | Mod: PBBFAC,,, | Performed by: ORTHOPAEDIC SURGERY

## 2024-10-23 PROCEDURE — 99999PBSHW PR PBB SHADOW TECHNICAL ONLY FILED TO HB: Mod: PBBFAC,,,

## 2024-10-23 PROCEDURE — 99213 OFFICE O/P EST LOW 20 MIN: CPT | Mod: PBBFAC,PN | Performed by: ORTHOPAEDIC SURGERY

## 2024-10-23 PROCEDURE — 20610 DRAIN/INJ JOINT/BURSA W/O US: CPT | Mod: PBBFAC,PN | Performed by: ORTHOPAEDIC SURGERY

## 2024-10-23 RX ORDER — TRIAMCINOLONE ACETONIDE 40 MG/ML
40 INJECTION, SUSPENSION INTRA-ARTICULAR; INTRAMUSCULAR
Status: DISCONTINUED | OUTPATIENT
Start: 2024-10-23 | End: 2024-10-23 | Stop reason: HOSPADM

## 2024-10-23 RX ADMIN — TRIAMCINOLONE ACETONIDE 40 MG: 40 INJECTION, SUSPENSION INTRA-ARTICULAR; INTRAMUSCULAR at 10:10

## 2024-10-23 NOTE — PROCEDURES
Large Joint Aspiration/Injection: R subacromial bursa    Date/Time: 10/23/2024 10:15 AM    Performed by: Oliver Guzman MD  Authorized by: Oliver Guzman MD    Consent Done?:  Yes (Verbal)  Indications:  Pain  Site marked: the procedure site was marked    Timeout: prior to procedure the correct patient, procedure, and site was verified    Local anesthetic:  Lidocaine 1% without epinephrine (Ropivicaine)  Anesthetic total (ml):  3      Details:  Needle Size:  22 G  Ultrasonic Guidance for needle placement?: No    Approach:  Posterior  Location:  Shoulder  Site:  R subacromial bursa  Medications:  40 mg triamcinolone acetonide 40 mg/mL  Patient tolerance:  Patient tolerated the procedure well with no immediate complications

## 2024-10-23 NOTE — PROGRESS NOTES
Patient ID: Rey Li is a 49 y.o. male    Chief Complaint:   Chief Complaint   Patient presents with    Right Shoulder - Pain    Injections       History of Present Illness:    Pleasant 49-year-old male right-hand dominant here for evaluation of right shoulder pain.  Reports off and on issues for the past several years but worse over the past year or so.  Had MRI done at an outside facility.  He brought the disc with him today.  Denies any falls or trauma but he is a labor, construction.  Pain is mostly in the right shoulder and will radiate to the biceps region.  He has had an injection about 3 months ago which did provide him with relief.    PAST MEDICAL HISTORY:   Past Medical History:   Diagnosis Date    Family history of cerebral aneurysm     Fractured coccyx 2007    2/t [facility]     PAST SURGICAL HISTORY: No past surgical history on file.  FAMILY HISTORY:   Family History   Problem Relation Name Age of Onset    Anuerysm Mother          brain    Hypertension Father      Pacemaker/defibrilator Father      No Known Problems Sister      No Known Problems Brother      No Known Problems Brother      No Known Problems Brother      No Known Problems Brother       SOCIAL HISTORY:   Social History     Occupational History    Occupation:    Tobacco Use    Smoking status: Every Day     Types: Cigars     Start date: 9/20/1990    Smokeless tobacco: Never    Tobacco comments:     2-3 cigars/day   Substance and Sexual Activity    Alcohol use: No    Drug use: No    Sexual activity: Yes     Partners: Female        MEDICATIONS:   Current Outpatient Medications:     clotrimazole-betamethasone 1-0.05% (LOTRISONE) cream, Apply topically 2 (two) times daily., Disp: , Rfl:     gabapentin (NEURONTIN) 300 MG capsule, Take 1 capsule PO TID., Disp: 90 capsule, Rfl: 2    HYDROcodone-acetaminophen (NORCO) 5-325 mg per tablet, Take 1 tablet by mouth., Disp: , Rfl:     LIDOcaine (LIDODERM) 5 %, SMARTSIG:Topical Daily,  Disp: , Rfl:     naproxen (EC NAPROSYN) 500 MG EC tablet, Take 1 tablet (500 mg total) by mouth 2 (two) times daily with meals., Disp: 60 tablet, Rfl: 2    sildenafiL (VIAGRA) 100 MG tablet, Take 100 mg by mouth daily as needed., Disp: , Rfl:   ALLERGIES: Review of patient's allergies indicates:  No Known Allergies      Physical Exam     Vitals:    10/23/24 1034   BP: (!) 101/55   Pulse: 66     Alert and oriented to person, place and time. No acute distress. Well-groomed, not ill appearing. Pupils round and reactive, normal respiratory effort, no audible wheezing.     On exam he has pain with provocative testing of the rotator cuff in abduction and forward flexion.  Passive forward flexion 160°, passive abduction 150°.  Empty can test positive.  Positive Speed's and Yergason's test.  Pain along the acromioclavicular joint and pain with cross-body adduction.        Imaging:       MRI: I have reviewed all pertinent results/findings and my personal findings are:  Full-thickness tear of the anterior supraspinatus with tendinosis of the infraspinatus.  No significant fatty atrophy.  acromioclavicular arthrosis.  Subacromial impingement      Assessment & Plan    Nontraumatic complete tear of right rotator cuff    Arthritis of right acromioclavicular joint    Superior glenoid labrum lesion of right shoulder, initial encounter         Treatment options were discussed with him in detail.  I went over his x-rays and MRI findings consistent with full-thickness rotator cuff tear, biceps but pathology as well as acromioclavicular arthritis and impingement.  We discussed the natural history of rotator cuff tear in detail and what this means for his shoulder long term.  We discussed nonoperative and operative treatment options.  We discussed non operatively injections and physical therapy knowing that this will not heal the tear and may cause long term damage of the shoulder.  Operatively we discussed rotator cuff repair with  indicated procedures arthroscopically in the timeline associated with this.  He is not in the position at this time to get a right shoulder arthroscopy.  He would however like to do this towards the beginning of the year if possible.  We will see him in 2 months for possible preop

## 2024-10-25 ENCOUNTER — PATIENT MESSAGE (OUTPATIENT)
Dept: RESEARCH | Facility: HOSPITAL | Age: 49
End: 2024-10-25
Payer: MEDICAID